# Patient Record
Sex: MALE | Race: WHITE | NOT HISPANIC OR LATINO | URBAN - METROPOLITAN AREA
[De-identification: names, ages, dates, MRNs, and addresses within clinical notes are randomized per-mention and may not be internally consistent; named-entity substitution may affect disease eponyms.]

---

## 2023-01-06 ENCOUNTER — EMERGENCY (EMERGENCY)
Facility: HOSPITAL | Age: 38
LOS: 0 days | Discharge: HOME | End: 2023-01-06
Attending: EMERGENCY MEDICINE | Admitting: EMERGENCY MEDICINE
Payer: COMMERCIAL

## 2023-01-06 VITALS
DIASTOLIC BLOOD PRESSURE: 97 MMHG | HEART RATE: 85 BPM | RESPIRATION RATE: 16 BRPM | TEMPERATURE: 99 F | OXYGEN SATURATION: 99 % | SYSTOLIC BLOOD PRESSURE: 181 MMHG

## 2023-01-06 DIAGNOSIS — K40.90 UNILATERAL INGUINAL HERNIA, WITHOUT OBSTRUCTION OR GANGRENE, NOT SPECIFIED AS RECURRENT: ICD-10-CM

## 2023-01-06 DIAGNOSIS — R10.9 UNSPECIFIED ABDOMINAL PAIN: ICD-10-CM

## 2023-01-06 PROCEDURE — 99284 EMERGENCY DEPT VISIT MOD MDM: CPT

## 2023-01-06 NOTE — ED ADULT TRIAGE NOTE - PRO INTERPRETER NEED 2
[facility]   [address]  [place] [address]  Phone: [phone]  Fax: [phone]              Madeline KARON Dorantes  1937    Encounter Date: 9/12/2017    LOUIS Martinez          PROGRESS NOTE:  No notes on file      Shantelle Spence M.D.  [address]  [facility] [address]  VIA Facsimile: [phone]                  English

## 2023-01-07 VITALS
OXYGEN SATURATION: 99 % | HEART RATE: 73 BPM | TEMPERATURE: 98 F | SYSTOLIC BLOOD PRESSURE: 149 MMHG | DIASTOLIC BLOOD PRESSURE: 76 MMHG | RESPIRATION RATE: 18 BRPM

## 2023-01-07 RX ORDER — DOCUSATE SODIUM 100 MG
1 CAPSULE ORAL
Qty: 14 | Refills: 0
Start: 2023-01-07 | End: 2023-01-13

## 2023-01-07 NOTE — CONSULT NOTE ADULT - ASSESSMENT
ASSESSMENT:  37yM w/ PSHx of  LIHR over 20yrs ago presents to the ED with reducible right inguinal hernia. Physical exam findings, imaging, and labs as documented above.   No signs of incarceration or strangulation  Hemodynamically  and clinically stable  Has bowel function; no signs of obstipation    PLAN:  - Patient will definitely required surgical repair of inguinal though does not warrant an inpatient admission  - Patient will follow up with Dr. Deleon's office for fast track to elective operative repair.  - Bowel regimen  - Return precautions provided    Above plan discussed with Attending Surgeon Dr. Deleon, patient, patient family, and Primary team  01-07-23 @ 01:08

## 2023-01-07 NOTE — ED PROVIDER NOTE - OBJECTIVE STATEMENT
37 yold male to Ed Pmhx left inguinal hernia repair 20 yr ago; c/o right inguinal hernia x years - has not seen surgeon for definitive repair; pt return here now because of pains to area intermittent x 1 week; hernia reducible currently and pt deneis n/v, nl bowel movements; pt deneis constipation, fever, chills, cough, sob;

## 2023-01-07 NOTE — ED PROVIDER NOTE - CARE PROVIDER_API CALL
Tae Deleon (DO)  Surgery  [address], [address], [place] [address]  Phone: [phone]  Fax: [phone]  Established Patient  Follow Up Time: 1-3 Days

## 2023-01-07 NOTE — ED PROVIDER NOTE - NS ED ATTENDING STATEMENT MOD
This was a shared visit with the APOLONIA. I reviewed and verified the documentation and independently performed the documented:

## 2023-01-07 NOTE — ED PROVIDER NOTE - ATTENDING APP SHARED VISIT CONTRIBUTION OF CARE
37-year-old male presents for evaluation of inguinal hernia.  Patient states he has had for several years came in today for evaluation due to intermittent discomfort x 1 week.  Denies any decreased bowel movements, urinary complaints, nausea, vomiting, fevers or chills.    VITAL SIGNS: noted  CONSTITUTIONAL: Well-developed; well-nourished; in no acute distress  HEAD: Normocephalic; atraumatic  EYES: PERRL, EOM intact; conjunctiva and sclera clear  ENT: No nasal discharge; airway clear. MMM  NECK: Supple; non tender.    CARD: S1, S2 normal; no murmurs, gallops, or rubs. Regular rate and rhythm  RESP: CTAB/L, no wheezes, rales or rhonchi  ABD: Normal bowel sounds; soft; non-distended; non-tender; + right inguinal hernia, no CVA tenderness  EXT: Normal ROM. No calf tenderness or edema. Distal pulses intact  NEURO: Alert, oriented. Grossly unremarkable. No focal deficits  SKIN: Skin exam is warm and dry

## 2023-01-07 NOTE — ED PROVIDER NOTE - PHYSICAL EXAMINATION
Constitutional: Well developed, well nourished. NAD  Head: Normocephalic, atraumatic.  Eyes: PERRL, EOMI.  ENT: No nasal discharge. Mucous membranes dry.  Neck: Supple. Painless ROM.  Cardiovascular:   Regular rate and rhythm.    Pulmonary:   Lungs clear to auscultation bilaterally.    Abdominal: Soft large indirect right inguinal hernia extending into scrotum; + bs; reducible; minimally tender; no abdominal pain;   Extremities. Pelvis stable. No lower extremity edema, symmetric calves.  Skin: No rashes, cyanosis.  Neuro: AAOx3. No focal neurological deficits.  Psych: Normal mood. Normal affect.

## 2023-01-07 NOTE — CONSULT NOTE ADULT - SUBJECTIVE AND OBJECTIVE BOX
GENERAL SURGERY CONSULT NOTE    Patient: RAHUL NAIR , 37y ([birth year])Male   MRN: [id number]  Location: [facility] ED  Visit: 01-06-23 Emergency  Date: 01-07-23 @ 01:08    HPI:  37 yold male to Ed Pmhx left inguinal hernia repair 20 yr ago; c/o right inguinal hernia x years - has not seen surgeon for definitive repair; pt return here now because of pains to area intermittent x 1 week; hernia reducible currently and pt deneis n/v, nl bowel movements; pt deneis constipation, fever, chills, cough, sob;    PAST MEDICAL & SURGICAL HISTORY:  History of Left inguinal hernia      Home Medications:    VITALS:  T(F): 99 (01-06-23 @ 23:25), Max: 99 (01-06-23 @ 23:25)  HR: 85 (01-06-23 @ 23:25) (85 - 85)  BP: 181/97 (01-06-23 @ 23:25) (181/97 - 181/97)  RR: 16 (01-06-23 @ 23:25) (16 - 16)  SpO2: 99% (01-06-23 @ 23:25) (99% - 99%)    PHYSICAL EXAM:  General: NAD, AAOx3, calm and cooperative  HEENT: NCAT, BRYAN, EOMI, Trachea ML, Neck supple  Cardiac: RRR  Respiratory: normal respiratory effort  Abdomen: Soft, non-distended, non-tender, no rebound, no guarding. Asymmetric inguinal region on standing R>L. Hernia extending into the right scrotum; completely reducible. B/l descended testes. No signs of incarceration or strangulation  Skin: Warm/dry, normal color

## 2023-01-07 NOTE — ED PROVIDER NOTE - NS ED ROS FT
Constitutional: No fever, chills.  Eyes: No visual changes.  ENT: No hearing changes.  Neck: No neck pain or stiffness.  Cardiovascular: No chest pain, palpitations, edema.  Pulmonary: No SOB, cough. No hemoptysis.  Abdominal:   see hpi  : No dysuria, frequency.  Neuro: No headache, syncope, dizziness.  MS: No back pain. No calf pain/swelling.  Psych: No suicidal ideations.

## 2023-01-07 NOTE — ED PROVIDER NOTE - PATIENT PORTAL LINK FT
You can access the FollowMyHealth Patient Portal offered by [facility] by registering at the following website: [email or web address]. By joining [employer]’s FollowMyHealth portal, you will also be able to view your health information using other applications (apps) compatible with our system.

## 2023-01-07 NOTE — ED PROVIDER NOTE - CLINICAL SUMMARY MEDICAL DECISION MAKING FREE TEXT BOX
Patient evaluated for inguinal hernia; evaluated by surgery, recommends outpatient follow-up for possible elective intervention/repair.  Patient agrees and comfortable with discharge.  Strict return precautions advised and patient verbalized understanding.

## 2023-01-09 PROBLEM — Z87.19 PERSONAL HISTORY OF OTHER DISEASES OF THE DIGESTIVE SYSTEM: Chronic | Status: ACTIVE | Noted: 2023-01-07

## 2023-01-12 ENCOUNTER — APPOINTMENT (OUTPATIENT)
Dept: SURGERY | Facility: CLINIC | Age: 38
End: 2023-01-12
